# Patient Record
Sex: FEMALE | Race: BLACK OR AFRICAN AMERICAN | Employment: UNEMPLOYED | URBAN - METROPOLITAN AREA
[De-identification: names, ages, dates, MRNs, and addresses within clinical notes are randomized per-mention and may not be internally consistent; named-entity substitution may affect disease eponyms.]

---

## 2024-06-07 ENCOUNTER — HOSPITAL ENCOUNTER (EMERGENCY)
Age: 49
Discharge: HOME | End: 2024-06-07
Attending: EMERGENCY MEDICINE
Payer: MEDICAID

## 2024-06-07 ENCOUNTER — APPOINTMENT (OUTPATIENT)
Dept: GENERAL RADIOLOGY | Age: 49
End: 2024-06-07
Payer: MEDICAID

## 2024-06-07 VITALS
WEIGHT: 210 LBS | HEIGHT: 62 IN | SYSTOLIC BLOOD PRESSURE: 141 MMHG | HEART RATE: 82 BPM | RESPIRATION RATE: 18 BRPM | OXYGEN SATURATION: 99 % | DIASTOLIC BLOOD PRESSURE: 95 MMHG | TEMPERATURE: 98 F | BODY MASS INDEX: 38.64 KG/M2

## 2024-06-07 DIAGNOSIS — J20.9 ACUTE BRONCHITIS, UNSPECIFIED ORGANISM: Primary | ICD-10-CM

## 2024-06-07 LAB
ALBUMIN SERPL-MCNC: 3.5 G/DL (ref 3.4–5)
ALBUMIN/GLOB SERPL: 0.9 {RATIO} (ref 1–2)
ALP LIVER SERPL-CCNC: 81 U/L
ALT SERPL-CCNC: 18 U/L
ANION GAP SERPL CALC-SCNC: 5 MMOL/L (ref 0–18)
AST SERPL-CCNC: 16 U/L (ref 15–37)
BASOPHILS # BLD AUTO: 0.07 X10(3) UL (ref 0–0.2)
BASOPHILS NFR BLD AUTO: 0.7 %
BILIRUB SERPL-MCNC: 0.2 MG/DL (ref 0.1–2)
BUN BLD-MCNC: 11 MG/DL (ref 9–23)
CALCIUM BLD-MCNC: 9.2 MG/DL (ref 8.5–10.1)
CHLORIDE SERPL-SCNC: 107 MMOL/L (ref 98–112)
CO2 SERPL-SCNC: 27 MMOL/L (ref 21–32)
CREAT BLD-MCNC: 1.14 MG/DL
DDIMER WHOLE BLOOD: 306 NG/ML DDU (ref ?–400)
EGFRCR SERPLBLD CKD-EPI 2021: 60 ML/MIN/1.73M2 (ref 60–?)
EOSINOPHIL # BLD AUTO: 0.31 X10(3) UL (ref 0–0.7)
EOSINOPHIL NFR BLD AUTO: 3 %
ERYTHROCYTE [DISTWIDTH] IN BLOOD BY AUTOMATED COUNT: 17.2 %
GLOBULIN PLAS-MCNC: 4.1 G/DL (ref 2.8–4.4)
GLUCOSE BLD-MCNC: 105 MG/DL (ref 70–99)
HCT VFR BLD AUTO: 33.5 %
HGB BLD-MCNC: 10.6 G/DL
IMM GRANULOCYTES # BLD AUTO: 0.02 X10(3) UL (ref 0–1)
IMM GRANULOCYTES NFR BLD: 0.2 %
LYMPHOCYTES # BLD AUTO: 3.32 X10(3) UL (ref 1–4)
LYMPHOCYTES NFR BLD AUTO: 32.3 %
MCH RBC QN AUTO: 24.8 PG (ref 26–34)
MCHC RBC AUTO-ENTMCNC: 31.6 G/DL (ref 31–37)
MCV RBC AUTO: 78.3 FL
MONOCYTES # BLD AUTO: 0.86 X10(3) UL (ref 0.1–1)
MONOCYTES NFR BLD AUTO: 8.4 %
NEUTROPHILS # BLD AUTO: 5.69 X10 (3) UL (ref 1.5–7.7)
NEUTROPHILS # BLD AUTO: 5.69 X10(3) UL (ref 1.5–7.7)
NEUTROPHILS NFR BLD AUTO: 55.4 %
OSMOLALITY SERPL CALC.SUM OF ELEC: 288 MOSM/KG (ref 275–295)
PLATELET # BLD AUTO: 296 10(3)UL (ref 150–450)
POTASSIUM SERPL-SCNC: 3.7 MMOL/L (ref 3.5–5.1)
PROT SERPL-MCNC: 7.6 G/DL (ref 6.4–8.2)
RBC # BLD AUTO: 4.28 X10(6)UL
SARS-COV-2 RNA RESP QL NAA+PROBE: NOT DETECTED
SODIUM SERPL-SCNC: 139 MMOL/L (ref 136–145)
WBC # BLD AUTO: 10.3 X10(3) UL (ref 4–11)

## 2024-06-07 PROCEDURE — 80053 COMPREHEN METABOLIC PANEL: CPT | Performed by: EMERGENCY MEDICINE

## 2024-06-07 PROCEDURE — 36415 COLL VENOUS BLD VENIPUNCTURE: CPT

## 2024-06-07 PROCEDURE — 94640 AIRWAY INHALATION TREATMENT: CPT

## 2024-06-07 PROCEDURE — 94799 UNLISTED PULMONARY SVC/PX: CPT

## 2024-06-07 PROCEDURE — 99284 EMERGENCY DEPT VISIT MOD MDM: CPT

## 2024-06-07 PROCEDURE — 85379 FIBRIN DEGRADATION QUANT: CPT | Performed by: EMERGENCY MEDICINE

## 2024-06-07 PROCEDURE — 85025 COMPLETE CBC W/AUTO DIFF WBC: CPT | Performed by: EMERGENCY MEDICINE

## 2024-06-07 PROCEDURE — 71046 X-RAY EXAM CHEST 2 VIEWS: CPT | Performed by: EMERGENCY MEDICINE

## 2024-06-07 RX ORDER — METHYLPREDNISOLONE 4 MG/1
TABLET ORAL
Qty: 1 EACH | Refills: 0 | Status: SHIPPED | OUTPATIENT
Start: 2024-06-07

## 2024-06-07 RX ORDER — IPRATROPIUM BROMIDE AND ALBUTEROL SULFATE 2.5; .5 MG/3ML; MG/3ML
3 SOLUTION RESPIRATORY (INHALATION) ONCE
Status: COMPLETED | OUTPATIENT
Start: 2024-06-07 | End: 2024-06-07

## 2024-06-07 RX ORDER — AMLODIPINE BESYLATE 5 MG/1
5 TABLET ORAL DAILY
COMMUNITY

## 2024-06-07 RX ORDER — METHYLPREDNISOLONE 4 MG/1
TABLET ORAL
Qty: 1 EACH | Refills: 0 | Status: SHIPPED | OUTPATIENT
Start: 2024-06-07 | End: 2024-06-07

## 2024-06-07 RX ORDER — PREDNISONE 20 MG/1
40 TABLET ORAL ONCE
Status: COMPLETED | OUTPATIENT
Start: 2024-06-07 | End: 2024-06-07

## 2024-06-07 RX ORDER — ALBUTEROL SULFATE 90 UG/1
2 AEROSOL, METERED RESPIRATORY (INHALATION) EVERY 4 HOURS PRN
Qty: 1 EACH | Refills: 0 | Status: SHIPPED | OUTPATIENT
Start: 2024-06-07 | End: 2024-06-07

## 2024-06-07 RX ORDER — ALBUTEROL SULFATE 90 UG/1
2 AEROSOL, METERED RESPIRATORY (INHALATION) EVERY 4 HOURS PRN
Qty: 1 EACH | Refills: 0 | Status: SHIPPED | OUTPATIENT
Start: 2024-06-07 | End: 2024-07-07

## 2024-06-07 NOTE — DISCHARGE INSTRUCTIONS
Follow-up with your primary care doctor within the next week for reassessment.  Take the medication as prescribed.  Return for any fevers, increasing shortness of breath or any other concerning symptoms.

## 2024-06-07 NOTE — ED INITIAL ASSESSMENT (HPI)
LARRY x 1 week. Patient c/o pain with cough due to severity of cough. Denies fever. Patient describes productive cough.

## 2024-06-07 NOTE — ED PROVIDER NOTES
Patient Seen in: [facility] In [place]      History     Chief Complaint   Patient presents with    Difficulty Breathing     Stated Complaint: LARRY    Subjective:   HPI    49-year-old female with past medical history of hypertension presents today for evaluation of a cough.  Over the past 2 weeks, patient has had a frequent cough.  The cough is productive of a thick mucus.  She had 1 transient episode of slight bloody cough 1 week ago (small amount that only occurred once).  She occasionally feels short of breath with a cough.  She does not any chest pain or fevers.  She does report a generalized thoracic soreness from coughing fits.  Patient denies any leg swelling, calf pain or previous history of DVT/PE.    Objective:   Past Medical History:    Chiari I malformation (HCC)    Essential hypertension              No pertinent past surgical history.              No pertinent social history.            Review of Systems    Positive for stated complaint: LARRY  Other systems are as noted in HPI.  Constitutional and vital signs reviewed.      All other systems reviewed and negative except as noted above.    Physical Exam     ED Triage Vitals   BP 06/07/24 0030 (!) 141/95   Pulse 06/07/24 0025 98   Resp 06/07/24 0025 16   Temp 06/07/24 0030 98 °F (36.7 °C)   Temp src 06/07/24 0030 Temporal   SpO2 06/07/24 0025 98 %   O2 Device 06/07/24 0030 None (Room air)       Current Vitals:   Vital Signs  BP: (!) 141/95  Pulse: 82  Resp: 18  Temp: 98 °F (36.7 °C)  Temp src: Temporal    Oxygen Therapy  SpO2: 99 %  O2 Device: None (Room air)            Physical Exam  Vitals and nursing note reviewed.   Constitutional:       Appearance: Normal appearance.   HENT:      Head: Normocephalic.      Nose: Nose normal.      Mouth/Throat:      Mouth: Mucous membranes are moist.   Eyes:      Extraocular Movements: Extraocular movements intact.   Cardiovascular:      Rate and Rhythm: Normal rate and regular rhythm.   Pulmonary:       Effort: Pulmonary effort is normal.      Comments: Bronchospastic cough, lungs otherwise clear  Abdominal:      General: Abdomen is flat.   Musculoskeletal:         General: Normal range of motion.      Right lower leg: No edema.      Left lower leg: No edema.   Skin:     General: Skin is warm.   Neurological:      General: No focal deficit present.      Mental Status: She is alert.   Psychiatric:         Mood and Affect: Mood normal.           ED Course     Labs Reviewed   COMP METABOLIC PANEL (14) - Abnormal; Notable for the following components:       Result Value    Glucose 105 (*)     Creatinine 1.14 (*)     A/G Ratio 0.9 (*)     All other components within normal limits   CBC W/ DIFFERENTIAL - Abnormal; Notable for the following components:    HGB 10.6 (*)     HCT 33.5 (*)     MCV 78.3 (*)     MCH 24.8 (*)     All other components within normal limits   D-DIMER TRIAGE - Normal   RAPID SARS-COV-2 BY PCR - Normal   CBC WITH DIFFERENTIAL WITH PLATELET    Narrative:     The following orders were created for panel order CBC With Differential With Platelet.  Procedure                               Abnormality         Status                     ---------                               -----------         ------                     CBC W/ DIFFERENTIAL[[id number]]          Abnormal            Final result                 Please view results for these tests on the individual orders.               2 view chest at 1:27 AM Eastern time    Comparison: None      IMPRESSION:    Borderline cardiac enlargement.    Normal pulmonary vascularity.    No acute cardiopulmonary abnormality is seen.    No consolidation or pleural effusion or pneumothorax.         MDM      Differential Diagnosis  49-year-old female presents today for evaluation of 1 week of a cough.  On auscultation, she does have a frequent bronchospastic cough.  She denies any fevers and she is satting well on room air.  Her work of breathing is nonlabored.  She  does report 1 transient episode of a slight amount of blood with one coughing fit.  Differential include pneumonia or bronchitis.  Will obtain D-dimer rule out pulmonary embolism.  Will give albuterol and reassess.    2:52 am  Chest x-ray report does not demonstrate any consolidation, pleural effusion or acute cardiopulmonary abnormality.  Laboratory workup is reassuring.  On reassessment, patient is no longer having a bronchospastic cough following albuterol treatment.  I suspect patient has bronchitis.  Will DC with a course of steroids along with albuterol.  I advised that she follow-up with her primary care doctor in the next week for reassessment and return to the ER for any worsening symptoms.  She feels comfortable with plan and was discharged in good condition.    Patient was informed of the low hemoglobin.  She states she has a history of anemia.  On review of records, in March 2022, patient had a hemoglobin of 9.8.                               Medical Decision Making      Disposition and Plan     Clinical Impression:  1. Acute bronchitis, unspecified organism         Disposition:  Discharge  6/7/2024  2:53 am    Follow-up:  Christine Morgan MD  [address]  [facility] [address] [phone]    Call in 1 day(s)            Medications Prescribed:  Discharge Medication List as of 6/7/2024  3:15 AM        START taking these medications    Details   albuterol 108 (90 Base) MCG/ACT Inhalation Aero Soln Inhale 2 puffs into the lungs every 4 (four) hours as needed for Wheezing., Normal, Disp-1 each, R-0      methylPREDNISolone (MEDROL) 4 MG Oral Tablet Therapy Pack Dosepack: take as directed, Normal, Disp-1 each, R-0